# Patient Record
Sex: MALE | Race: OTHER | NOT HISPANIC OR LATINO | URBAN - METROPOLITAN AREA
[De-identification: names, ages, dates, MRNs, and addresses within clinical notes are randomized per-mention and may not be internally consistent; named-entity substitution may affect disease eponyms.]

---
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
## 2019-10-02 NOTE — CONSULT NOTE ADULT - I WAS PHYSICALLY PRESENT FOR THE KEY PORTIONS OF THE EVALUATION AND MANAGEMENT (E/M) SERVICE PROVIDED.  I AGREE WITH THE ABOVE HISTORY, PHYSICAL, AND PLAN WHICH I HAVE REVIEWED AND EDITED WHERE APPROPRIATE
"Requested Prescriptions   Pending Prescriptions Disp Refills     finasteride (PROSCAR) 5 MG tablet [Pharmacy Med Name: FINASTERIDE 5 MG TABLET] 90 tablet 1     Sig: TAKE 1 TABLET BY MOUTH EVERY DAY       Alpha Blockers Failed - 10/2/2019  8:00 AM        Failed - Blood pressure under 140/90 in past 12 months     BP Readings from Last 3 Encounters:   09/04/19 (!) 144/79   07/17/19 130/74   02/26/19 128/70                 Passed - Recent (12 mo) or future (30 days) visit within the authorizing provider's specialty     Patient has had an office visit with the authorizing provider or a provider within the authorizing providers department within the previous 12 mos or has a future within next 30 days. See \"Patient Info\" tab in inbasket, or \"Choose Columns\" in Meds & Orders section of the refill encounter.              Passed - Patient does not have Tadalafil, Vardenafil, or Sildenafil on their medication list        Passed - Medication is active on med list        Passed - Patient is 18 years of age or older        Last Written Prescription Date:  3/8/19  Last Fill Quantity: 90,  # refills: 1   Last office visit: 7/17/2019 with prescribing provider:  Lucina   Future Office Visit:      "
Statement Selected

## 2022-12-07 NOTE — DISCHARGE NOTE ADULT - MEDICATION SUMMARY - MEDICATIONS TO TAKE
"Walk with no pain". I will START or STAY ON the medications listed below when I get home from the hospital:    Oxaydo 5 mg oral tablet  -- 1 tab(s) by mouth every 6 hours, As needed, Moderate Pain (4 - 6) and Severe Pain (7 - 10) MDD:4  -- Indication: For pain    docusate sodium 100 mg oral capsule  -- 1 cap(s) by mouth 3 times a day  -- Indication: For Constipation    senna oral tablet  -- 2 tab(s) by mouth once a day (at bedtime)  -- Indication: For Constipation    amoxicillin-clavulanate 875 mg-125 mg oral tablet  -- 1 tab(s) by mouth every 12 hours MDD:2  -- Indication: For antibiotics    folic acid 1 mg oral tablet  -- 1 tab(s) by mouth once a day  -- Indication: For folic acid

## 2023-03-03 NOTE — PATIENT PROFILE ADULT. - PAIN CHRONIC, PROFILE
Pharmacy faxed in a request for prior authorization on:    Medication: Semaglutide, 1 MG/DOSE, (Ozempic, 1 MG/DOSE,) 4 MG/3ML Solution Pen-injector    Dosage: Inject 1 mg into the skin every 7 days.      Quantity requested:  Disp-3 mL, R-5      Pharmacy for prescription has been selected.    Prior authorization request has been initiated and sent for completion.    
no

## 2023-12-20 NOTE — DISCHARGE NOTE ADULT - NSTOBACCOWEBSITE_GEN_A_NCS
Prescription transmitted to [facility] pharmacy for albuterol nebulizer solution  
[place] website --- [email or web address] Website --- [email or web address]

## 2024-02-21 PROBLEM — D58.8 OTHER SPECIFIED HEREDITARY HEMOLYTIC ANEMIAS: Chronic | Status: ACTIVE | Noted: 2017-07-22

## 2024-02-21 PROBLEM — R10.83 COLIC: Chronic | Status: ACTIVE | Noted: 2017-07-22

## 2024-02-21 PROBLEM — R17 UNSPECIFIED JAUNDICE: Chronic | Status: ACTIVE | Noted: 2017-07-22

## 2024-02-28 ENCOUNTER — APPOINTMENT (OUTPATIENT)
Dept: ORTHOPEDIC SURGERY | Facility: CLINIC | Age: 24
End: 2024-02-28
Payer: MEDICAID

## 2024-02-28 DIAGNOSIS — Z78.9 OTHER SPECIFIED HEALTH STATUS: ICD-10-CM

## 2024-02-28 DIAGNOSIS — G56.32 LESION OF RADIAL NERVE, LEFT UPPER LIMB: ICD-10-CM

## 2024-02-28 PROCEDURE — 73110 X-RAY EXAM OF WRIST: CPT | Mod: LT

## 2024-02-28 PROCEDURE — 73080 X-RAY EXAM OF ELBOW: CPT | Mod: LT

## 2024-02-28 PROCEDURE — 99203 OFFICE O/P NEW LOW 30 MIN: CPT

## 2024-02-28 NOTE — IMAGING
[de-identified] : skin intact. no swelling. passive: full ROM of elbow, wrist, and hand. active:  - elbow ROM: good extension, flexion. - wrist ROM: extension to neutral with radial deviation. good flexion. - unable to extend/retropulse thumb, good FPL. IF/MF/RF/SF MPJ extensor lag, good IPJ extension. flex to full fist. good finger abduction, adduction. elbow extension 5/5, flexion 4/5. wrist extension 3/5 to neutral, flexion 4+/5. unable to extend/retropulse thumb or other digits.  4/5.   XRAYS OF LEFT ELBOW: no acute displaced fracture or dislocation. XRAYS OF LEFT WRIST: no acute displaced fracture or dislocation.

## 2024-02-28 NOTE — HISTORY OF PRESENT ILLNESS
[10] : 10 [Radiating] : radiating [Throbbing] : throbbing [Constant] : constant [Sleep] : sleep [Nothing helps with pain getting better] : Nothing helps with pain getting better [de-identified] : 2/28/24: HPI obtained with  (Vietnamese) 23yo RHD male presents for diffuse LEFT upper extremity pain and weakness after he was hospitalized @[facility] for anemia requiring transfusion and breathing problems for 15 days, including 8 days in ICU during which he was intubated, in January 2024. Reports that an IV was placed in the LUE. OT x 4 sessions @[facility]. Reports some improvement in wrist/hand ROM with therapy.  EDX 2/20/24 - IMPRESSION: severe LEFT radial motor nerve neuropathy (DML NR at axilla, above elbow, elbow, forearm. DSL NR at wrist).  Hx: Hereditary hemolytic anemia s/p splenectomy. [] : no [FreeTextEntry5] : L. Wrist/Elbow pain & weakness that started after being hospitalized @ [facility] in 01/2024. Needed a blood transfusion and was admitted due to complications. States pain started after injected with fluids/medications. Difficulty moving thumb. Unable to lift more than 2 lbs. Had EMG completed. Requires .  [FreeTextEntry7] : left arm

## 2024-02-28 NOTE — ASSESSMENT
[FreeTextEntry1] : EDX reviewed with patient - LEFT radial nerve palsy. The condition was explained to the patient. Discussed that most compressive radial nerve palsies resolve with time, but may not recover full function.  EDX also shows radial nerve dysfunction on the RIGHT, but patient denies RIGHT hand/wrist pain, numbness, or weakness.  - wear wrist brace for activity and sleep. - continue OT to maintain joint passive ROM and strengthen LUE as nerve recovers.  F/u 3 months.

## 2024-05-29 ENCOUNTER — APPOINTMENT (OUTPATIENT)
Dept: ORTHOPEDIC SURGERY | Facility: CLINIC | Age: 24
End: 2024-05-29

## 2025-03-29 NOTE — PROGRESS NOTE ADULT - SKIN
Patient brought in for left hip redness and swelling. Patient was at [facility] yesterday given oral antibiotics but has gotten larger in size and causing patient pain. no fever. Patient is awake and alert. hx of adenoidectomy, tonsillectomy, molluscum. SHANNAN, ROSE. No lesions; no rash